# Patient Record
Sex: FEMALE | Race: WHITE | Employment: FULL TIME | URBAN - METROPOLITAN AREA
[De-identification: names, ages, dates, MRNs, and addresses within clinical notes are randomized per-mention and may not be internally consistent; named-entity substitution may affect disease eponyms.]

---

## 2019-09-12 ENCOUNTER — THERAPY VISIT (OUTPATIENT)
Dept: PHYSICAL THERAPY | Facility: CLINIC | Age: 54
End: 2019-09-12
Payer: COMMERCIAL

## 2019-09-12 DIAGNOSIS — M72.2 PLANTAR FASCIITIS: ICD-10-CM

## 2019-09-12 PROCEDURE — 97161 PT EVAL LOW COMPLEX 20 MIN: CPT | Mod: GP | Performed by: PHYSICAL THERAPIST

## 2019-09-12 PROCEDURE — 97035 APP MDLTY 1+ULTRASOUND EA 15: CPT | Mod: GP | Performed by: PHYSICAL THERAPIST

## 2019-09-12 PROCEDURE — 97110 THERAPEUTIC EXERCISES: CPT | Mod: GP | Performed by: PHYSICAL THERAPIST

## 2019-09-12 NOTE — PROGRESS NOTES
[facility] Initial Evaluation  Subjective:    Aniceto Rajput being seen for R heel/foot.   Problem began 9/3/2019 (MD appt). Where condition occurred: for unknown reasons.Problem occurred: Insidious onset 6 months ago, history of previous right plantar fascia pain many years ago  and reported as 7/10 on pain scale. General health as reported by patient is good. Pertinent medical history includes:  Menopausal, migraines/headaches and thyroid problems.  Medical allergies: none.  Surgeries include:  None.  Current medications:  Thyroid medication.   Primary job tasks include:  Prolonged sitting.  Pain quality: tearing and bruised feeling. and is constant. Pain is the same all the time. Since onset symptoms are gradually worsening. Imaging testing: none. Previous treatment includes other (steroid injection). There was none improvement following previous treatment.   Patient is . Restrictions include:  Working in normal job without restrictions.    Barriers include:  None as reported by patient.  Red flags:  None as reported by patient.  Type of problem:  Right foot (heel)   Condition occurred with:  Insidious onset. This is a chronic condition    Patient reports pain:  Medial calcaneal tuberosity and longitudinal arch. Radiates to:  No radiation. Associated symptoms:  Loss of motion/stiffness. Symptoms are exacerbated by transfers, walking, bending/squatting, descending stairs and ascending stairs and relieved by ice.                      Objective:  Standing Alignment:                Ankle/Foot:  Pes cavus R and pes cavus L    Gait:    Gait Type:  Antalgic   Assistive Devices:  None  Deviations:  Ankle:  Heel strike decr R and push off decr RGeneral Deviations:  Corina decr, stance time decr and stride length decr    Flexibility/Screens:       Lower Extremity:  Decreased left lower extremity flexibility:Gastroc    Decreased right lower extremity flexibility:  Hamstrings and  Gastroc          Ankle/Foot Evaluation  ROM:    AROM:    Dorsiflexion:  Left:   11  Right:   5  Plantarflexion:  Left:  70    Right:  65  Inversion:  Left:  WNL     Right:  WNL  Eversion:  WNL     Right:  WNL  Great toe flexion:  Left:  WNL     Right:  WNL  Great Toe Extension:  Left:  WNL     Right: WNL  PROM:    Dorsiflexion: Left:        Right:   8   Plantarflexion: Left:        Right:  70            Endfeel:  Firm  Strength:    Dorsiflexion:  Left: 5/5      Pain:-   Right: 5/5    Pain:-  Plantarflexion: Left: 5/5    Pain:-   Right: 4/5   Pain:+  Inversion:Left: 5/5   Pain:-     Right: 5/5   Pain:-  Eversion:Left: 5/5   Pain:-  Right: 5/5   Pain:-                      PALPATION:     Right ankle tenderness present at:   plantar fascia and medial calcaneal  EDEMA: normal            FUNCTIONAL TESTS:           Proprioception:  Stork Balance Test: Left: 30 seconds  Right: 20 seconds with increased muscle strategy                                          Hip Evaluation    Hip Strength:    Flexion:   Left: 5/5   -  Pain:  Right: 4+/5   -  Pain:                      Abduction:  Left: 5/5    -   Pain:Right: 4/5   -   Pain:                                 General     ROS    Assessment/Plan:    Patient is a 54 year old female with right foot complaints complaints.    Patient has the following significant findings with corresponding treatment plan.                Diagnosis 1:  Plantar fasciitis  Pain -  hot/cold therapy, US, manual therapy, self management, education, directional preference exercise and home program  Decreased ROM/flexibility - manual therapy, therapeutic exercise, therapeutic activity and home program  Decreased strength - therapeutic exercise, therapeutic activities and home program  Decreased proprioception - neuro re-education, gait training, therapeutic activities and home program  Impaired gait - gait training and home program  Decreased function - therapeutic activities and home program    Therapy  Evaluation Codes:   1) History comprised of:   Personal factors that impact the plan of care:      Time since onset of symptoms.    Comorbidity factors that impact the plan of care are:      None.     Medications impacting care: None.  2) Examination of Body Systems comprised of:   Body structures and functions that impact the plan of care:      foot.   Activity limitations that impact the plan of care are:      Squatting/kneeling, Stairs, Standing, Walking and transfers from sit to stand.  3) Clinical presentation characteristics are:   Stable/Uncomplicated.  4) Decision-Making    Low complexity using standardized patient assessment instrument and/or measureable assessment of functional outcome.  Cumulative Therapy Evaluation is: Low complexity.    Previous and current functional limitations:  (See Goal Flow Sheet for this information)    Short term and Long term goals: (See Goal Flow Sheet for this information)     Communication ability:  Patient appears to be able to clearly communicate and understand verbal and written communication and follow directions correctly.  Treatment Explanation - The following has been discussed with the patient:   RX ordered/plan of care  Anticipated outcomes  Possible risks and side effects  This patient would benefit from PT intervention to resume normal activities.   Rehab potential is good.    Frequency:  1 X week, once daily  Duration:  for 8 weeks  Discharge Plan:  Achieve all LTG.  Independent in home treatment program.  Reach maximal therapeutic benefit.    Please refer to the daily flowsheet for treatment today, total treatment time and time spent performing 1:1 timed codes.

## 2019-09-12 NOTE — LETTER
[facility] ATHLETIC [facility] PHYSICAL THERAPY  [address]  [place] [address]  [phone]    2019    Re: Aniceto Rajput   :   1965  MRN:  [id number]   REFERRING PHYSICIAN:   Benjamin Felty    [facility]    Date of Initial Evaluation:  2019  Visits:  Rxs Used: 1  Reason for Referral:  Plantar fasciitis    EVALUATION SUMMARY    Subjective:  Aniceto Rajput being seen for R heel/foot.   Problem began 9/3/2019 (MD appt). Where condition occurred: for unknown reasons.Problem occurred: Insidious onset 6 months ago, history of previous right plantar fascia pain many years ago  and reported as 7/10 on pain scale. General health as reported by patient is good. Pertinent medical history includes:  Menopausal, migraines/headaches and thyroid problems.  Medical allergies: none.  Surgeries include:  None.  Current medications:  Thyroid medication.   Primary job tasks include:  Prolonged sitting.  Pain quality: tearing and bruised feeling. and is constant. Pain is the same all the time. Since onset symptoms are gradually worsening. Imaging testing: none. Previous treatment includes other (steroid injection). There was none improvement following previous treatment.   Patient is . Restrictions include:  Working in normal job without restrictions.  Barriers include:  None as reported by patient.  Red flags:  None as reported by patient.  Type of problem:  Right foot (heel)  Condition occurred with:  Insidious onset. This is a chronic condition    Patient reports pain:  Medial calcaneal tuberosity and longitudinal arch. Radiates to:  No radiation. Associated symptoms:  Loss of motion/stiffness. Symptoms are exacerbated by transfers, walking, bending/squatting, descending stairs and ascending stairs and relieved by ice.                  Objective:  Standing Alignment:    Ankle/Foot:  Pes  cavus R and pes cavus L  Gait:    Gait Type:  Antalgic   Assistive Devices:  None  Deviations:  Ankle:  Heel strike decr R and push off decr RGeneral Deviations:  Corina decr, stance time decr and stride length decr  Flexibility/Screens:   Lower Extremity:  Decreased left lower extremity flexibility:Gastroc  Decreased right lower extremity flexibility:  Hamstrings and Gastroc  Re: Aniceto Rajput   :   1965    Ankle/Foot Evaluation  ROM:    AROM:    Dorsiflexion:  Left:   11  Right:   5  Plantarflexion:  Left:  70    Right:  65  Inversion:  Left:  WNL     Right:  WNL  Eversion:  WNL     Right:  WNL  Great toe flexion:  Left:  WNL     Right:  WNL  Great Toe Extension:  Left:  WNL     Right: WNL  PROM:    Dorsiflexion: Left:        Right:   8   Plantarflexion: Left:        Right:  70  Endfeel:  Firm  Strength:    Dorsiflexion:  Left: 5/5      Pain:-   Right: 5/5    Pain:-  Plantarflexion: Left: 5/5    Pain:-   Right: 4/5   Pain:+  Inversion:Left: 5/5   Pain:-     Right: 5/5   Pain:-  Eversion:Left: 5/5   Pain:-  Right: 5/5   Pain:-  PALPATION:   Right ankle tenderness present at:   plantar fascia and medial calcaneal  EDEMA: normal     FUNCTIONAL TESTS:   Proprioception:  Stork Balance Test: Left: 30 seconds  Right: 20 seconds with increased muscle strategy           Hip Evaluation  Hip Strength:    Flexion:   Left: 5/5   -  Pain:  Right: 4+/5   -  Pain:                  Abduction:  Left: 5/5    -   Pain:Right: 4/5   -   Pain:    Assessment/Plan:    Patient is a 54 year old female with right foot complaints complaints.    Patient has the following significant findings with corresponding treatment plan.                Diagnosis 1:  Plantar fasciitis  Pain -  hot/cold therapy, US, manual therapy, self management, education, directional preference exercise and home program  Decreased ROM/flexibility - manual therapy, therapeutic exercise, therapeutic activity and home program  Decreased strength - therapeutic  exercise, therapeutic activities and home program  Decreased proprioception - neuro re-education, gait training, therapeutic activities and home program  Impaired gait - gait training and home program  Decreased function - therapeutic activities and home program    Therapy Evaluation Codes:   1) History comprised of:   Personal factors that impact the plan of care:      Time since onset of symptoms.    Comorbidity factors that impact the plan of care are:      None.     Medications impacting care: None.  2) Examination of Body Systems comprised of:  Re: Aniceto Rajput   :   1965     Body structures and functions that impact the plan of care:      foot.   Activity limitations that impact the plan of care are:      Squatting/kneeling, Stairs, Standing, Walking and transfers from sit to stand.  3) Clinical presentation characteristics are:   Stable/Uncomplicated.  4) Decision-Making    Low complexity using standardized patient assessment instrument and/or measureable assessment of functional outcome.  Cumulative Therapy Evaluation is: Low complexity.    Previous and current functional limitations:  (See Goal Flow Sheet for this information)    Short term and Long term goals: (See Goal Flow Sheet for this information)     Communication ability:  Patient appears to be able to clearly communicate and understand verbal and written communication and follow directions correctly.  Treatment Explanation - The following has been discussed with the patient:   RX ordered/plan of care  Anticipated outcomes  Possible risks and side effects  This patient would benefit from PT intervention to resume normal activities.   Rehab potential is good.    Frequency:  1 X week, once daily  Duration:  for 8 weeks  Discharge Plan:  Achieve all LTG.  Independent in home treatment program.  Reach maximal therapeutic benefit.    Thank you for your referral.    INQUIRIES  Therapist: Sarai Celeste, PT  [facility] [place]  [address] [address]  Phone: [phone]  Fax: [phone]

## 2019-09-19 ENCOUNTER — THERAPY VISIT (OUTPATIENT)
Dept: PHYSICAL THERAPY | Facility: CLINIC | Age: 54
End: 2019-09-19
Payer: COMMERCIAL

## 2019-09-19 DIAGNOSIS — M72.2 PLANTAR FASCIITIS: ICD-10-CM

## 2019-09-19 PROCEDURE — 97140 MANUAL THERAPY 1/> REGIONS: CPT | Mod: GP | Performed by: PHYSICAL THERAPIST

## 2019-09-19 PROCEDURE — 97110 THERAPEUTIC EXERCISES: CPT | Mod: GP | Performed by: PHYSICAL THERAPIST

## 2019-09-19 PROCEDURE — 97035 APP MDLTY 1+ULTRASOUND EA 15: CPT | Mod: GP | Performed by: PHYSICAL THERAPIST

## 2019-09-27 ENCOUNTER — THERAPY VISIT (OUTPATIENT)
Dept: PHYSICAL THERAPY | Facility: CLINIC | Age: 54
End: 2019-09-27
Payer: COMMERCIAL

## 2019-09-27 DIAGNOSIS — M72.2 PLANTAR FASCIITIS: ICD-10-CM

## 2019-09-27 PROCEDURE — 97140 MANUAL THERAPY 1/> REGIONS: CPT | Mod: GP | Performed by: PHYSICAL THERAPIST

## 2019-09-27 PROCEDURE — 97110 THERAPEUTIC EXERCISES: CPT | Mod: GP | Performed by: PHYSICAL THERAPIST

## 2019-09-27 PROCEDURE — 97112 NEUROMUSCULAR REEDUCATION: CPT | Mod: GP | Performed by: PHYSICAL THERAPIST

## 2019-10-04 ENCOUNTER — THERAPY VISIT (OUTPATIENT)
Dept: PHYSICAL THERAPY | Facility: CLINIC | Age: 54
End: 2019-10-04
Payer: COMMERCIAL

## 2019-10-04 DIAGNOSIS — M72.2 PLANTAR FASCIITIS: ICD-10-CM

## 2019-10-04 PROCEDURE — 97112 NEUROMUSCULAR REEDUCATION: CPT | Mod: GP | Performed by: PHYSICAL THERAPIST

## 2019-10-04 PROCEDURE — 97140 MANUAL THERAPY 1/> REGIONS: CPT | Mod: GP | Performed by: PHYSICAL THERAPIST

## 2019-10-04 PROCEDURE — 97110 THERAPEUTIC EXERCISES: CPT | Mod: GP | Performed by: PHYSICAL THERAPIST

## 2019-10-11 ENCOUNTER — THERAPY VISIT (OUTPATIENT)
Dept: PHYSICAL THERAPY | Facility: CLINIC | Age: 54
End: 2019-10-11
Payer: COMMERCIAL

## 2019-10-11 DIAGNOSIS — M72.2 PLANTAR FASCIITIS: ICD-10-CM

## 2019-10-11 PROCEDURE — 97140 MANUAL THERAPY 1/> REGIONS: CPT | Mod: GP | Performed by: PHYSICAL THERAPIST

## 2019-10-11 PROCEDURE — 97112 NEUROMUSCULAR REEDUCATION: CPT | Mod: GP | Performed by: PHYSICAL THERAPIST

## 2019-10-11 PROCEDURE — 97110 THERAPEUTIC EXERCISES: CPT | Mod: GP | Performed by: PHYSICAL THERAPIST

## 2019-10-22 ENCOUNTER — THERAPY VISIT (OUTPATIENT)
Dept: PHYSICAL THERAPY | Facility: CLINIC | Age: 54
End: 2019-10-22
Payer: COMMERCIAL

## 2019-10-22 DIAGNOSIS — M72.2 PLANTAR FASCIITIS: ICD-10-CM

## 2019-10-22 PROCEDURE — 97112 NEUROMUSCULAR REEDUCATION: CPT | Mod: GP | Performed by: PHYSICAL THERAPIST

## 2019-10-22 PROCEDURE — 97140 MANUAL THERAPY 1/> REGIONS: CPT | Mod: GP | Performed by: PHYSICAL THERAPIST

## 2019-10-22 PROCEDURE — 97110 THERAPEUTIC EXERCISES: CPT | Mod: GP | Performed by: PHYSICAL THERAPIST

## 2019-11-01 ENCOUNTER — THERAPY VISIT (OUTPATIENT)
Dept: PHYSICAL THERAPY | Facility: CLINIC | Age: 54
End: 2019-11-01
Payer: COMMERCIAL

## 2019-11-01 DIAGNOSIS — M72.2 PLANTAR FASCIITIS: ICD-10-CM

## 2019-11-01 PROCEDURE — 97140 MANUAL THERAPY 1/> REGIONS: CPT | Mod: GP | Performed by: PHYSICAL THERAPIST

## 2019-11-01 PROCEDURE — 97530 THERAPEUTIC ACTIVITIES: CPT | Mod: GP | Performed by: PHYSICAL THERAPIST

## 2019-11-01 PROCEDURE — 97110 THERAPEUTIC EXERCISES: CPT | Mod: GP | Performed by: PHYSICAL THERAPIST

## 2019-11-08 ENCOUNTER — THERAPY VISIT (OUTPATIENT)
Dept: PHYSICAL THERAPY | Facility: CLINIC | Age: 54
End: 2019-11-08
Payer: COMMERCIAL

## 2019-11-08 DIAGNOSIS — M72.2 PLANTAR FASCIITIS: ICD-10-CM

## 2019-11-08 PROCEDURE — 97140 MANUAL THERAPY 1/> REGIONS: CPT | Mod: GP | Performed by: PHYSICAL THERAPIST

## 2019-11-08 PROCEDURE — 97110 THERAPEUTIC EXERCISES: CPT | Mod: GP | Performed by: PHYSICAL THERAPIST

## 2019-11-08 PROCEDURE — 97112 NEUROMUSCULAR REEDUCATION: CPT | Mod: GP | Performed by: PHYSICAL THERAPIST

## 2019-11-08 NOTE — LETTER
[facility] ATHLETIC [facility] PHYSICAL THERAPY  [address]  [place] [address]  [phone]    2019    Re: Aniceto Rajput   :   1965  MRN:  [id number]   REFERRING PHYSICIAN:   Benjamin Felty    [facility]IC [facility] PHYSICAL THERAPY    Date of Initial Evaluation:  2019  Visits:  Rxs Used: 8  Reason for Referral:  Plantar fasciitis    DISCHARGE REPORT  Progress reporting period is from 19 to 19.  Patient has completed 8 PT visits.       SUBJECTIVE  Subjective: Patient feeling currently ~85-90% overall improved. Able to transfer from sit to stand consistently with PL's 0-2/10, basically pain free. Walking all desired distances painfree. Was able to run/walk on two different occasions this [place] a total of 10 minutes without increased pain afterwards. 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     Current Pain level: 0/10.     Initial Pain level: 7/10.   Changes in function:  Yes (See Goal flowsheet attached for changes in current functional level)  Adverse reaction to treatment or activity: None    OBJECTIVE  Objective: Overall, improved gait without visible antalgic gait/deviations. Improved AROM of right ankle: DF 20 from 5 degrees(L 20), PF70 from 65 degrees(L 70) IN/EV B remain WNL. Tenderness to deep pressure along medial aspect of R heel. Improved right plantar flexion strength from 4/5(+ pain) to 5-/5 and painfree. SLS B x60 seconds each.       ASSESSMENT/PLAN  Updated problem list and treatment plan: Diagnosis 1:  Right plantar fasciitis  Decreased strength - home program  STG/LTGs have been met or progress has been made towards goals:  Yes (See Goal flow sheet completed today.)  Assessment of Progress: The patient has met all of their long term goals.  Self Management  Plans:  Patient is independent in a home treatment program.  Patient is independent in self management of symptoms.  I have re-evaluated this patient and find that the nature, scope, duration and intensity of the therapy is appropriate for the medical condition of the patient.    Re: Aniceto Rajput   :   1965    Aniceto continues to require the following intervention to meet STG and LTG's:  PT intervention is no longer required to meet STG/LTG.    Recommendations:  This patient is ready to be discharged from therapy and continue their home treatment program.    Thank you for your referral.    INQUIRIES  Therapist: Sarai Celeste, PT  [facility] [place] [facility]ICAL THERAPY  [address] [address]  Phone: [phone]  Fax: [phone]

## 2019-11-11 PROBLEM — M72.2 PLANTAR FASCIITIS: Status: RESOLVED | Noted: 2019-09-12 | Resolved: 2019-11-08

## 2019-11-12 NOTE — PROGRESS NOTES
Subjective:  HPI                    Objective:  System    Physical Exam    General     ROS    Assessment/Plan:    DISCHARGE REPORT    Progress reporting period is from 9/12/19 to 11/8/19.  Patient has completed 8 PT visits.       SUBJECTIVE  Subjective: Patient feeling currently ~85-90% overall improved. Able to transfer from sit to stand consistently with PL's 0-2/10, basically pain free. Walking all desired distances painfree. Was able to run/walk on two different occasions this past [place] for a total of 10 minutes without increased pain afterwards. 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     Current Pain level: 0/10.     Initial Pain level: 7/10.   Changes in function:  Yes (See Goal flowsheet attached for changes in current functional level)  Adverse reaction to treatment or activity: None    OBJECTIVE    Objective: Overall, improved gait without visible antalgic gait/deviations. Improved AROM of right ankle: DF 20 from 5 degrees(L 20), PF70 from 65 degrees(L 70) IN/EV B remain WNL. Tenderness to deep pressure along medial aspect of R heel. Improved right plantar flexion strength from 4/5(+ pain) to 5-/5 and painfree. SLS B x60 seconds each.       ASSESSMENT/PLAN  Updated problem list and treatment plan: Diagnosis 1:  Right plantar fasciitis  Decreased strength - home program  STG/LTGs have been met or progress has been made towards goals:  Yes (See Goal flow sheet completed today.)  Assessment of Progress: The patient has met all of their long term goals.  Self Management Plans:  Patient is independent in a home treatment program.  Patient is independent in self management of symptoms.  I have re-evaluated this patient and find that the nature, scope, duration and intensity of the therapy is appropriate for the medical condition of the patient.  Aniceto continues to require the following  intervention to meet STG and LTG's:  PT intervention is no longer required to meet STG/LTG.    Recommendations:  This patient is ready to be discharged from therapy and continue their home treatment program.    Please refer to the daily flowsheet for treatment today, total treatment time and time spent performing 1:1 timed codes.

## 2020-03-15 ENCOUNTER — HEALTH MAINTENANCE LETTER (OUTPATIENT)
Age: 55
End: 2020-03-15

## 2021-01-15 ENCOUNTER — HEALTH MAINTENANCE LETTER (OUTPATIENT)
Age: 56
End: 2021-01-15

## 2021-05-09 ENCOUNTER — HEALTH MAINTENANCE LETTER (OUTPATIENT)
Age: 56
End: 2021-05-09

## 2021-10-24 ENCOUNTER — HEALTH MAINTENANCE LETTER (OUTPATIENT)
Age: 56
End: 2021-10-24

## 2022-04-10 ENCOUNTER — HEALTH MAINTENANCE LETTER (OUTPATIENT)
Age: 57
End: 2022-04-10

## 2022-06-05 ENCOUNTER — HEALTH MAINTENANCE LETTER (OUTPATIENT)
Age: 57
End: 2022-06-05

## 2022-10-15 ENCOUNTER — HEALTH MAINTENANCE LETTER (OUTPATIENT)
Age: 57
End: 2022-10-15

## 2023-06-11 ENCOUNTER — HEALTH MAINTENANCE LETTER (OUTPATIENT)
Age: 58
End: 2023-06-11